# Patient Record
Sex: MALE | Race: BLACK OR AFRICAN AMERICAN | NOT HISPANIC OR LATINO | URBAN - METROPOLITAN AREA
[De-identification: names, ages, dates, MRNs, and addresses within clinical notes are randomized per-mention and may not be internally consistent; named-entity substitution may affect disease eponyms.]

---

## 2020-11-01 ENCOUNTER — HOSPITAL ENCOUNTER (INPATIENT)
Facility: HOSPITAL | Age: 20
LOS: 1 days | Discharge: HOME | DRG: 897 | End: 2020-11-02
Attending: EMERGENCY MEDICINE | Admitting: HOSPITALIST
Payer: MEDICAID

## 2020-11-01 DIAGNOSIS — T68.XXXA HYPOTHERMIA, INITIAL ENCOUNTER: Primary | ICD-10-CM

## 2020-11-01 DIAGNOSIS — E87.20 LACTIC ACIDOSIS: ICD-10-CM

## 2020-11-01 DIAGNOSIS — F10.929 ACUTE ALCOHOL INTOXICATION: ICD-10-CM

## 2020-11-01 DIAGNOSIS — F10.929 ALCOHOL INTOXICATION: ICD-10-CM

## 2020-11-01 PROBLEM — T83.9XXA FOLEY CATHETER PROBLEM: Status: ACTIVE | Noted: 2020-11-01

## 2020-11-01 PROBLEM — T83.9XXA FOLEY CATHETER PROBLEM: Status: RESOLVED | Noted: 2020-11-01 | Resolved: 2020-11-01

## 2020-11-01 LAB
ALBUMIN SERPL BCP-MCNC: 4.5 G/DL (ref 3.5–5.2)
ALP SERPL-CCNC: 85 U/L (ref 55–135)
ALT SERPL W/O P-5'-P-CCNC: 24 U/L (ref 10–44)
AMPHET+METHAMPHET UR QL: NEGATIVE
ANION GAP SERPL CALC-SCNC: 13 MMOL/L (ref 8–16)
AST SERPL-CCNC: 24 U/L (ref 10–40)
BACTERIA #/AREA URNS AUTO: ABNORMAL /HPF
BARBITURATES UR QL SCN>200 NG/ML: NEGATIVE
BASOPHILS # BLD AUTO: 0.02 K/UL (ref 0–0.2)
BASOPHILS NFR BLD: 0.2 % (ref 0–1.9)
BENZODIAZ UR QL SCN>200 NG/ML: NEGATIVE
BILIRUB SERPL-MCNC: 0.3 MG/DL (ref 0.1–1)
BILIRUB UR QL STRIP: NEGATIVE
BUN SERPL-MCNC: 6 MG/DL (ref 6–30)
BUN SERPL-MCNC: 7 MG/DL (ref 6–20)
BZE UR QL SCN: NEGATIVE
CALCIUM SERPL-MCNC: 9.2 MG/DL (ref 8.7–10.5)
CANNABINOIDS UR QL SCN: NEGATIVE
CHLORIDE SERPL-SCNC: 101 MMOL/L (ref 95–110)
CHLORIDE SERPL-SCNC: 103 MMOL/L (ref 95–110)
CLARITY UR REFRACT.AUTO: ABNORMAL
CO2 SERPL-SCNC: 27 MMOL/L (ref 23–29)
COLOR UR AUTO: ABNORMAL
CREAT SERPL-MCNC: 1 MG/DL (ref 0.5–1.4)
CREAT SERPL-MCNC: 1.3 MG/DL (ref 0.5–1.4)
CREAT UR-MCNC: 96 MG/DL (ref 23–375)
CTP QC/QA: YES
DIFFERENTIAL METHOD: ABNORMAL
EOSINOPHIL # BLD AUTO: 0 K/UL (ref 0–0.5)
EOSINOPHIL NFR BLD: 0.2 % (ref 0–8)
ERYTHROCYTE [DISTWIDTH] IN BLOOD BY AUTOMATED COUNT: 13.5 % (ref 11.5–14.5)
EST. GFR  (AFRICAN AMERICAN): >60 ML/MIN/1.73 M^2
EST. GFR  (NON AFRICAN AMERICAN): >60 ML/MIN/1.73 M^2
ETHANOL SERPL-MCNC: 273 MG/DL
GLUCOSE SERPL-MCNC: 118 MG/DL (ref 70–110)
GLUCOSE SERPL-MCNC: 119 MG/DL (ref 70–110)
GLUCOSE UR QL STRIP: NEGATIVE
HCT VFR BLD AUTO: 51.4 % (ref 40–54)
HCT VFR BLD CALC: 54 %PCV (ref 36–54)
HGB BLD-MCNC: 16.1 G/DL (ref 14–18)
HGB UR QL STRIP: ABNORMAL
HYALINE CASTS UR QL AUTO: 0 /LPF
IMM GRANULOCYTES # BLD AUTO: 0.02 K/UL (ref 0–0.04)
IMM GRANULOCYTES NFR BLD AUTO: 0.2 % (ref 0–0.5)
KETONES UR QL STRIP: NEGATIVE
LACTATE SERPL-SCNC: 2.3 MMOL/L (ref 0.5–2.2)
LACTATE SERPL-SCNC: 3 MMOL/L (ref 0.5–2.2)
LEUKOCYTE ESTERASE UR QL STRIP: NEGATIVE
LYMPHOCYTES # BLD AUTO: 1.2 K/UL (ref 1–4.8)
LYMPHOCYTES NFR BLD: 15 % (ref 18–48)
MCH RBC QN AUTO: 26.2 PG (ref 27–31)
MCHC RBC AUTO-ENTMCNC: 31.3 G/DL (ref 32–36)
MCV RBC AUTO: 84 FL (ref 82–98)
METHADONE UR QL SCN>300 NG/ML: NEGATIVE
MICROSCOPIC COMMENT: ABNORMAL
MONOCYTES # BLD AUTO: 0.2 K/UL (ref 0.3–1)
MONOCYTES NFR BLD: 2.8 % (ref 4–15)
NEUTROPHILS # BLD AUTO: 6.6 K/UL (ref 1.8–7.7)
NEUTROPHILS NFR BLD: 81.6 % (ref 38–73)
NITRITE UR QL STRIP: NEGATIVE
NRBC BLD-RTO: 0 /100 WBC
OPIATES UR QL SCN: NEGATIVE
PCP UR QL SCN>25 NG/ML: NEGATIVE
PH UR STRIP: 6 [PH] (ref 5–8)
PLATELET # BLD AUTO: 326 K/UL (ref 150–350)
PMV BLD AUTO: 9.6 FL (ref 9.2–12.9)
POC IONIZED CALCIUM: 1.13 MMOL/L (ref 1.06–1.42)
POC TCO2 (MEASURED): 27 MMOL/L (ref 23–29)
POTASSIUM BLD-SCNC: 4.4 MMOL/L (ref 3.5–5.1)
POTASSIUM SERPL-SCNC: 4.4 MMOL/L (ref 3.5–5.1)
PROT SERPL-MCNC: 8.6 G/DL (ref 6–8.4)
PROT UR QL STRIP: ABNORMAL
RBC # BLD AUTO: 6.14 M/UL (ref 4.6–6.2)
RBC #/AREA URNS AUTO: >100 /HPF (ref 0–4)
SAMPLE: ABNORMAL
SARS-COV-2 RDRP RESP QL NAA+PROBE: NEGATIVE
SODIUM BLD-SCNC: 142 MMOL/L (ref 136–145)
SODIUM SERPL-SCNC: 143 MMOL/L (ref 136–145)
SP GR UR STRIP: 1.01 (ref 1–1.03)
TOXICOLOGY INFORMATION: NORMAL
TROPONIN I SERPL DL<=0.01 NG/ML-MCNC: 0.01 NG/ML (ref 0–0.03)
TSH SERPL DL<=0.005 MIU/L-ACNC: 0.58 UIU/ML (ref 0.4–4)
URN SPEC COLLECT METH UR: ABNORMAL
WBC # BLD AUTO: 8.09 K/UL (ref 3.9–12.7)
WBC #/AREA URNS AUTO: 2 /HPF (ref 0–5)

## 2020-11-01 PROCEDURE — 99223 1ST HOSP IP/OBS HIGH 75: CPT | Mod: ,,, | Performed by: HOSPITALIST

## 2020-11-01 PROCEDURE — 81001 URINALYSIS AUTO W/SCOPE: CPT

## 2020-11-01 PROCEDURE — 85025 COMPLETE CBC W/AUTO DIFF WBC: CPT

## 2020-11-01 PROCEDURE — 99291 CRITICAL CARE FIRST HOUR: CPT | Mod: ,,, | Performed by: EMERGENCY MEDICINE

## 2020-11-01 PROCEDURE — 80307 DRUG TEST PRSMV CHEM ANLYZR: CPT

## 2020-11-01 PROCEDURE — 96361 HYDRATE IV INFUSION ADD-ON: CPT

## 2020-11-01 PROCEDURE — 11000001 HC ACUTE MED/SURG PRIVATE ROOM

## 2020-11-01 PROCEDURE — 80320 DRUG SCREEN QUANTALCOHOLS: CPT

## 2020-11-01 PROCEDURE — 84484 ASSAY OF TROPONIN QUANT: CPT

## 2020-11-01 PROCEDURE — 63600175 PHARM REV CODE 636 W HCPCS: Performed by: STUDENT IN AN ORGANIZED HEALTH CARE EDUCATION/TRAINING PROGRAM

## 2020-11-01 PROCEDURE — 83605 ASSAY OF LACTIC ACID: CPT

## 2020-11-01 PROCEDURE — 93005 ELECTROCARDIOGRAM TRACING: CPT

## 2020-11-01 PROCEDURE — 96360 HYDRATION IV INFUSION INIT: CPT

## 2020-11-01 PROCEDURE — 51702 INSERT TEMP BLADDER CATH: CPT

## 2020-11-01 PROCEDURE — 99285 EMERGENCY DEPT VISIT HI MDM: CPT | Mod: 25

## 2020-11-01 PROCEDURE — 80047 BASIC METABLC PNL IONIZED CA: CPT

## 2020-11-01 PROCEDURE — 83605 ASSAY OF LACTIC ACID: CPT | Mod: 91

## 2020-11-01 PROCEDURE — U0002 COVID-19 LAB TEST NON-CDC: HCPCS | Performed by: PHYSICIAN ASSISTANT

## 2020-11-01 PROCEDURE — 84443 ASSAY THYROID STIM HORMONE: CPT

## 2020-11-01 PROCEDURE — 99223 PR INITIAL HOSPITAL CARE,LEVL III: ICD-10-PCS | Mod: ,,, | Performed by: HOSPITALIST

## 2020-11-01 PROCEDURE — 99291 PR CRITICAL CARE, E/M 30-74 MINUTES: ICD-10-PCS | Mod: ,,, | Performed by: EMERGENCY MEDICINE

## 2020-11-01 PROCEDURE — 25000003 PHARM REV CODE 250: Performed by: PHYSICIAN ASSISTANT

## 2020-11-01 PROCEDURE — 80053 COMPREHEN METABOLIC PANEL: CPT

## 2020-11-01 RX ORDER — GLUCAGON 1 MG
1 KIT INJECTION
Status: DISCONTINUED | OUTPATIENT
Start: 2020-11-01 | End: 2020-11-02 | Stop reason: HOSPADM

## 2020-11-01 RX ORDER — SODIUM CHLORIDE 0.9 % (FLUSH) 0.9 %
10 SYRINGE (ML) INJECTION
Status: DISCONTINUED | OUTPATIENT
Start: 2020-11-01 | End: 2020-11-02 | Stop reason: HOSPADM

## 2020-11-01 RX ORDER — SODIUM CHLORIDE, SODIUM LACTATE, POTASSIUM CHLORIDE, CALCIUM CHLORIDE 600; 310; 30; 20 MG/100ML; MG/100ML; MG/100ML; MG/100ML
INJECTION, SOLUTION INTRAVENOUS CONTINUOUS
Status: ACTIVE | OUTPATIENT
Start: 2020-11-02 | End: 2020-11-02

## 2020-11-01 RX ORDER — IBUPROFEN 200 MG
16 TABLET ORAL
Status: DISCONTINUED | OUTPATIENT
Start: 2020-11-01 | End: 2020-11-02 | Stop reason: HOSPADM

## 2020-11-01 RX ORDER — SODIUM CHLORIDE 0.9 % (FLUSH) 0.9 %
10 SYRINGE (ML) INJECTION
Status: CANCELLED | OUTPATIENT
Start: 2020-11-01

## 2020-11-01 RX ORDER — SODIUM CHLORIDE, SODIUM LACTATE, POTASSIUM CHLORIDE, CALCIUM CHLORIDE 600; 310; 30; 20 MG/100ML; MG/100ML; MG/100ML; MG/100ML
INJECTION, SOLUTION INTRAVENOUS CONTINUOUS
Status: ACTIVE | OUTPATIENT
Start: 2020-11-01 | End: 2020-11-01

## 2020-11-01 RX ORDER — IBUPROFEN 200 MG
24 TABLET ORAL
Status: DISCONTINUED | OUTPATIENT
Start: 2020-11-01 | End: 2020-11-02 | Stop reason: HOSPADM

## 2020-11-01 RX ADMIN — SODIUM CHLORIDE, SODIUM LACTATE, POTASSIUM CHLORIDE, AND CALCIUM CHLORIDE: .6; .31; .03; .02 INJECTION, SOLUTION INTRAVENOUS at 01:11

## 2020-11-01 RX ADMIN — SODIUM CHLORIDE 1000 ML: 0.9 INJECTION, SOLUTION INTRAVENOUS at 06:11

## 2020-11-01 RX ADMIN — SODIUM CHLORIDE, SODIUM LACTATE, POTASSIUM CHLORIDE, AND CALCIUM CHLORIDE 1000 ML: .6; .31; .03; .02 INJECTION, SOLUTION INTRAVENOUS at 01:11

## 2020-11-01 RX ADMIN — SODIUM CHLORIDE, SODIUM LACTATE, POTASSIUM CHLORIDE, AND CALCIUM CHLORIDE 1000 ML: .6; .31; .03; .02 INJECTION, SOLUTION INTRAVENOUS at 05:11

## 2020-11-01 NOTE — MEDICAL/APP STUDENT
"[facility] Hospital Medicine  History & Physical      Sukumar Roman  YOB: 2000  Medical Record Number:  [id number]  Attending Physician:  Cam Rincon MD, [facility] HOSP MED 1   Date of Admission: 11/1/2020     Principal Problem:  Hypothermia    History     Source: Girlfriend and EMR    Cc: Per girlfriend "he got blacked out at a party last night"    HPI: Mr. Sukumar Roman is a 19 yo M with no relevant PMHx presenting with acute alcohol intoxication and hypothermia.    Per his girlfriend, pt was out partying last night and drank "a case of Truly, a fifth of [place], and several beers" which is normal at a party for him. Upon arriving back at their hotel by er, he was "not responding right" and his girl friend was unable to get him to stand up and come inside. Pt transitioned from saying "stop" to grunting unintelligibly and then stopped responding. She attempted to move him for 40min to an hour before calling EMS (outside temp of 64F). On EMS arrival pt was responsive to painful stimuli only. Pt was shivering in the ambulance and rectal temp returned at 92.4F. Attempted to contact mother for further hx and only reached voice mail.    On arrival to ED: vitals were stable except for low temp. External active warming with bear hugger was started and a 1L NS bolus was given. ECG, CXR, and CT head without contrast were performed. Piedra was inserted.  Patient regained consciousness shortly after being seen by medical student and was back to baseline by 11AM and able to drink. Piedra was removed per request but no urination without piedra despite attempt at 11AM.    Allergies  Review of patient's allergies indicates:  No Known Allergies    Medications  No current outpatient medications    History reviewed. No pertinent past medical history.     No past surgical history on file.     Social History     Tobacco Use    Smoking status: Infrequent vaping   Substance Use Topics    Alcohol use: " Binge drinking, social    Drug use: Not on file      ROS - N/A. Patient unable to respond.    Physical Examination     General:  Patient was obtunded on exam and only reacted to pain. Lying comfortably and snoring throughout. No shivering. On room air. Hansen in place, draining blood tinged urine. Peripheral IV in R cubital fossa.    Vital Signs  Vitals  Temp: 97.2 °F (36.2 °C)  Temp src: Core Bladder  Pulse: 92  Resp: 16  SpO2: 96 %  O2 Device (Oxygen Therapy): room air  BP: 123/62  MAP (mmHg): 84          24 Hour VS Range    Temp:  [92.4 °F (33.6 °C)-97.2 °F (36.2 °C)]   Pulse:  []   Resp:  [13-18]   BP: (100-145)/(51-75)   SpO2:  [95 %-100 %]   No intake or output data in the 24 hours ending 11/01/20 1125      Head: NCAT  Eyes: conjunctivae and lids normal, no scleral icterus. Pin-point pupils at room light but equal, round, and reactive when lights were turned off.  ENMT:  no gingival bleeding, normal oral mucosa without pallor or cyanosis.   Neck: Trachea non-displaced.     Chest:  Normal respiratory effort. Snoring heavily. Chest clear to auscultation.  No wheezes, rales, or rhonchi.  Heart:  Normal PMI, S1 S2 normal quality, splitting.  No murmurs rubs or gallops.  Peripheral pulses 3+.  Abdomen:  Non-distended, normal bowel sounds, non-tender.    Extremities:  No edema. Normal capillary refill.  Skin:  Warm and dry.  No cyanosis or pallor.  No ulcers, stasis.  IV sites without tenderness or inflammation.    Neurological / Psychiatric:  Obtunded.  No facial asymmetry.     Data     Recent Labs   Lab 11/01/20 0436 11/01/20 0443   WBC 8.09  --    HGB 16.1  --    HCT 51.4 54     --       No results for input(s): PROTIME, INR in the last 168 hours.     Recent Labs   Lab 11/01/20 0436      K 4.4      CO2 27   BUN 7   CREATININE 1.0   ANIONGAP 13   CALCIUM 9.2      Recent Labs   Lab 11/01/20 0436   PROT 8.6*   ALBUMIN 4.5   BILITOT 0.3   ALKPHOS 85   AST 24   ALT 24      Lactate,  veinous: 2.3  EtOH lvl: 273  Urine toxicology: negative  TSH: nl (0.582)    U/A: showed elevated blood and protein. One large clot noticed in piedra bag after insertion.    Telemetry: Not performed    ECG:  Normal sinus rhythm. ST elevation: consider lateral injury or acute infarct (Radiologist review and interpretation)    CXR: Diminished depth of inspiration with crowding, without additional radiographic evidence for acute intrathoracic process.     CT head w/o contrast: There is no evidence for acute intracranial process.    Assessment & Plan     Mr Roman is a 20 year old man here for acute alcohol intoxication and hypothermia.  Service: Hospital Medicine    ECG abnormalities  Possible ST segment elevations on ECG this AM. No clinical symptoms, no risk factors for JOSÉ. Low suspicion for MI. Most likely due to hypothermia.  - repeat ECG  - trend troponins if ECG remains abnormal  - see hypothermia plan    Hypothermia  Temperature from 92.4F and unresponsive to 97F and baseline mentation.  - Active warming with bear hugger  - monitor temperatures q1hr  - telemetry for arrhythmias    Acute alcohol intoxication  Resolved  - received 1L NS in ED  - planned additional 1 L but pt became responsive and is now able to tolerate PO hydration  - CXR to evaluate for aspiration    Abnormal neuro exam  Pt hyporeflexic. L lateral eye deviation per resident exam. Pin-point pupils. Pt now back to baseline  - CT scan for possible head trauma  - repeat neuro exam q4 hr    Lactic acidosis  Likely secondary to intoxication/hypothermia  - if admitted, follow with repeat draw    Hematuria  Suspected to be 2/2 piedra trauma.  - keep piedra in place until urine normal color  - ensure patient can void without pain after piedra removal    No DVT PPx. Pt ambulating, no other risk factors.    Reese Malik  [facility]

## 2020-11-01 NOTE — SUBJECTIVE & OBJECTIVE
History reviewed. No pertinent past medical history.    No past surgical history on file.    Review of patient's allergies indicates:  No Known Allergies    No current facility-administered medications on file prior to encounter.      No current outpatient medications on file prior to encounter.     Family History     None        Tobacco Use    Smoking status: Not on file   Substance and Sexual Activity    Alcohol use: Not on file    Drug use: Not on file    Sexual activity: Not on file     Review of Systems   Unable to perform ROS: Patient unresponsive     Objective:     Vital Signs (Most Recent):  Temp: 97.2 °F (36.2 °C) (11/01/20 0837)  Pulse: 92 (11/01/20 0837)  Resp: 16 (11/01/20 0837)  BP: (!) 120/56 (11/01/20 1132)  SpO2: 96 % (11/01/20 0837) Vital Signs (24h Range):  Temp:  [92.4 °F (33.6 °C)-97.2 °F (36.2 °C)] 97.2 °F (36.2 °C)  Pulse:  [] 92  Resp:  [13-18] 16  SpO2:  [95 %-100 %] 96 %  BP: (100-145)/(51-75) 120/56     Weight: 127 kg (280 lb)  Body mass index is 35.95 kg/m².    Physical Exam  Vitals signs and nursing note reviewed.   Constitutional:       General: He is sleeping. He is not in acute distress.     Appearance: He is obese.   HENT:      Head: Normocephalic and atraumatic.      Mouth/Throat:      Mouth: Mucous membranes are moist.      Pharynx: Oropharynx is clear.   Eyes:      Conjunctiva/sclera: Conjunctivae normal.   Neck:      Musculoskeletal: Neck supple.   Cardiovascular:      Rate and Rhythm: Normal rate and regular rhythm.      Pulses: Normal pulses.      Heart sounds: Normal heart sounds. No murmur.   Pulmonary:      Effort: Pulmonary effort is normal. No respiratory distress.      Breath sounds: Normal breath sounds. No wheezing.   Abdominal:      General: Abdomen is flat. There is no distension.      Palpations: Abdomen is soft.   Musculoskeletal:         General: No swelling or tenderness.   Skin:     General: Skin is warm and dry.      Findings: No rash.   Neurological:       Mental Status: He is unresponsive.      GCS: GCS eye subscore is 2. GCS verbal subscore is 1. GCS motor subscore is 6.       Significant Labs:   CBC:   Recent Labs   Lab 11/01/20 0436 11/01/20 0443   WBC 8.09  --    HGB 16.1  --    HCT 51.4 54     --      CMP:   Recent Labs   Lab 11/01/20 0436      K 4.4      CO2 27   *   BUN 7   CREATININE 1.0   CALCIUM 9.2   PROT 8.6*   ALBUMIN 4.5   BILITOT 0.3   ALKPHOS 85   AST 24   ALT 24   ANIONGAP 13   EGFRNONAA >60.0     Lactic Acid:   Recent Labs   Lab 11/01/20 0436   LACTATE 2.3*     Urine Studies:   Recent Labs   Lab 11/01/20  0643   COLORU Red*   APPEARANCEUA Hazy*   PHUR 6.0   SPECGRAV 1.010   PROTEINUA 2+*   GLUCUA Negative   KETONESU Negative   BILIRUBINUA Negative   OCCULTUA 3+*   NITRITE Negative   LEUKOCYTESUR Negative   RBCUA >100*   WBCUA 2   BACTERIA Rare   HYALINECASTS 0       Significant Imaging:     Chest XR: Diminished depth of inspiration with crowding, without additional radiographic evidence for acute intrathoracic process. (Timoteo Brown MD).     CT Head: There is no evidence for acute intracranial process. (Timoteo Brown MD)

## 2020-11-01 NOTE — DISCHARGE SUMMARY
"[facility] Hospital Medicine  Discharge Summary      Patient Name: Sukumar Roman  MRN: [id number]  Admission Date: 11/1/2020  Hospital Length of Stay: 0 days  Discharge Date and Time:  11/01/2020 5:19 PM  Attending Physician: Cam Rincon MD   Discharging Provider: Faustina Ledbetter MD  Primary Care Provider: No primary care provider on file.  Hospital Medicine Team: [facility] MED 1 Faustina Ledbetter MD    HPI:   Mr. Roman is a 20Y man with no known PMHx who presented to the ED via EMS after becoming unresponsive after a night out drinking for Halloween. History was obtained from the patient's girlfriend as the patient was somnolent during my interview. Per the girlfriend, the patient was at a friend's house party before going to a bar. Throughout the course of the night, the patient reportedly drank a case of Trulys, half of a fifth of Crown, and a few beers. He was awake and responsive until they got back to their hotel, at which point he sat down and fell asleep. A little while later, she attempted to wake him up but was unable to get him to respond, at which time she called EMS. She states that when he does drink for parties or special occasions, he can drink a "pretty good amount" because of his size, however this was somewhat more than he usually drinks. She reports he does not take any prescription medications or use recreational drugs, however was not with him the entire night and cannot confirm that he did not take anything.     ED Course: Arrived in ED and found to have T 35C, otherwise vitals WNL. CBC/CMP WNL, Lactate 2.3, EtOH 273. Repeat temp 33.6C. Given 1L NS bolus. CXR and CT Head unremarkable. Started re-warming patient with Claudia Hugger. Patient subsequently admitted to Hospital Medicine for management of hypothermia and acute alcohol intoxication.     * No surgery found *      Hospital Course:   Patient had bear hugger until temperature back to >98F. Patient on continuous LR as well as " received 2L IVF. Patient mentation back to baseline by 11 AM, he is determined that he would like to go home. EKG showed ST elevation, discussed with Cardiology. Likely artifact in the setting of a healthy 21 yo male and negative troponin. Patient has lactic acidosis, suspect pre renal in setting of hypothermia and alcohol intoxication. Patient has hematuria improving throughout the day with normal Cr. Patient states he feels back to baseline and understands his kidneys were injured both due to dehydration as well as during initial piedra insertion. He will receive 1 more L of LR and will be discharged with short follow up with PCP and discussion to return to the ED if his urine becomes more bloody or he urinates clots.      Consults:     No new Assessment & Plan notes have been filed under this hospital service since the last note was generated.  Service: Hospital Medicine    Final Active Diagnoses:      Problems Resolved During this Admission:    Diagnosis Date Noted Date Resolved POA    PRINCIPAL PROBLEM:  Hypothermia [T68.XXXA] 11/01/2020 11/01/2020 Yes    Alcoholic intoxication with complication [F10.929] 11/01/2020 11/01/2020 Yes    Lactic acid acidosis [E87.2] 11/01/2020 11/01/2020 Yes    Piedra catheter problem [T83.9XXA] 11/01/2020 11/01/2020 No       Discharged Condition: good    Disposition: Home or Self Care    Follow Up:    Patient Instructions:      Ambulatory referral/consult to Family Practice   Standing Status: Future   Referral Priority: Routine Referral Type: Consultation   Referral Reason: Specialty Services Required   Requested Specialty: Family Medicine   Number of Visits Requested: 1       Significant Diagnostic Studies: Labs:   CMP   Recent Labs   Lab 11/01/20  0436      K 4.4      CO2 27   *   BUN 7   CREATININE 1.0   CALCIUM 9.2   PROT 8.6*   ALBUMIN 4.5   BILITOT 0.3   ALKPHOS 85   AST 24   ALT 24   ANIONGAP 13   ESTGFRAFRICA >60.0   EGFRNONAA >60.0   , CBC   Recent  Labs   Lab 11/01/20  0436 11/01/20  0443   WBC 8.09  --    HGB 16.1  --    HCT 51.4 54     --     and Troponin   Recent Labs   Lab 11/01/20  1500   TROPONINI 0.008       Pending Diagnostic Studies:     None         Medications:  Reconciled Home Medications:      Medication List      You have not been prescribed any medications.         Indwelling Lines/Drains at time of discharge:   Lines/Drains/Airways     None                 Time spent on the discharge of patient: 35 minutes  Patient was seen and examined on the date of discharge and determined to be suitable for discharge.         Faustina Ledbetter MD  Department of Hospital Medicine  [facility]

## 2020-11-01 NOTE — ED NOTES
Pt. Resting in bed in NAD. RR e/u. Continuous cardiac, BP, and O2 monitoring in progress. VS being monitoring continuously per MD orders. Pt. Offered bathroom assistance and denies need at this time.PT aware needing to provide urine sample.

## 2020-11-01 NOTE — ED NOTES
Care assumed. PT on bp and o2 monitor. RR 18 even  And unlabored. PT aware he is going to be discharged if he can urinate.       LOC: The patient is awake, alert, and aware of environment. The patient is oriented x 3 and speaking appropriately.   APPEARANCE: No acute distress noted.   PSYCHOSOCIAL: Patient is calm and cooperative.   SKIN: The skin is warm, dry.   RESPIRATORY: Airway is open and patent. Bilateral chest rise and fall. Respirations are spontaneous, even and unlabored. Normal effort and rate noted. No accessory muscle use noted.   CARDIAC:  Denies chest pain or SOB.   ABDOMEN: Soft and non tender to palpation. No distention noted.   URINARY:  Voids independently.   NEUROLOGIC: Eyes open spontaneously. Speech clear. Tolerating saliva secretions well. Able to follow commands, demonstrating ability to actively and appropriately communicate within context of current conversation. Symmetrical facial muscles. Moving all extremities well. Movement is purposeful.   MUSCULOSKELETAL: No obvious deformities noted.

## 2020-11-01 NOTE — HOSPITAL COURSE
Patient warmed with Claudia Hugger until temperature back to >98F. Patient received total of 2L IVF and started on continuous LR as well. Mentation returned to baseline by 11 AM, he is determined that he would like to go home. EKG showed ST elevation, which was discussed with Cardiology and determined likely to be artifact given patient is an otherwise healthy 20Y male with a negative troponin. Patient also found to have lactic acidosis of 2.3 on admit which stew to peak of 3.0. Suspected pre-renal etiology in setting of hypothermia and alcohol intoxication; after hydration this resolved with lactate 1.5 the following morning. Patient also developed hematuria following insertion of Hansen. Creatinine remained normal with no other signs of kidney injury, and patient's hematuria was noted to improve with subsequent spontaneous voiding. Patient understands need to follow up with PCP within one week of discharge; also had discussion regarding need to return to the ED if his urine becomes more bloody or he urinates clots. Patient was seen and examined on the day of discharge and determined to be stable

## 2020-11-01 NOTE — SIGNIFICANT EVENT
Although patient's mentation is improving, patient still has hematuria and lactic acidosis. Patient notified he would benefit from staying overnight for IVF for pre renal lactic acidosis and confirm improvement of hematuria. Patient understands.        Faustina Ledbetter MD  Internal Medicine Resident Physician, PGY-2

## 2020-11-01 NOTE — ASSESSMENT & PLAN NOTE
Hansen catheter was inserted on patient's arrival to ED. Blood clot noted when catheter was flushed with resultant blood-tinged urine. Likely secondary to trauma from insertion.     Plan:    - Maintain Hansen until patient's urine demonstrates signs of clearing   - Continue IVF for hydration   - Once Hansen removed, will need to monitor to ensure patient can void

## 2020-11-01 NOTE — ED TRIAGE NOTES
"Sukumar Roman, a 20 y.o. male presents to the ED w/ complaint of alcohol intoxication. Pt's girlfriend called 911 after pt was found unresponsive lying outside in a sprinkler. Pt's girlfriend reports that the pt had several drinks of crown whiskey at a Halloween party and then continued to have several mixed drinks at a bar following the party. Unknown drug use. Pt's girlfriend reports pt "blacked out" and was lying outside in the sprinkler. Pt responds to painful stimuli only. Pt protecting airway at this time, sats 100% on room air. Pt hypothermic at this time. Pt does not have any pertinent PMH.    Triage note:  Chief Complaint   Patient presents with    Alcohol Intoxication     Pt arrives via EMS for alcohol intoxication.     Review of patient's allergies indicates:  Not on File  No past medical history on file.  "

## 2020-11-01 NOTE — ED NOTES
Pt Hansen catheter flushed. Large clot noted in collection bag. Urine draining appropriately at this time. NOEMÍ Chang aware of blood tinged urine.

## 2020-11-01 NOTE — ASSESSMENT & PLAN NOTE
Patient presented after consuming, per girlfriend, a case of Trulys in addition to at least half of a fifth of [place]. EtOH on admit 273. Patient somnolent and difficult to arouse, but arousable and able to follow commands on my exam.    Plan:   - Supportive care: IV fluids   - Continue telemetry    - Follow up [facility] for co-ingestions   - Repeat lactic acid in 6 hours   - Keep NPO since still somnolent

## 2020-11-01 NOTE — H&P
"[facility] Hospital Medicine  History & Physical    Patient Name: Sukumar Roman  MRN: [id number]  Admission Date: 11/1/2020  Attending Physician: Cam Rincon MD   Primary Care Provider: No primary care provider on file.    Hospital Medicine Team: [facility] MED 1 Darrius Duran MD     Patient information was obtained from spouse/SO and ER records.     Subjective:     Principal Problem:Hypothermia    Chief Complaint:   Chief Complaint   Patient presents with    Alcohol Intoxication     Pt arrives via EMS for alcohol intoxication.        HPI: Mr. Roman is a 20Y man with no known PMHx who presented to the ED via EMS after becoming unresponsive after a night out drinking for Halloween. History was obtained from the patient's girlfriend as the patient was somnolent during my interview. Per the girlfriend, the patient was at a friend's house party before going to a bar. Throughout the course of the night, the patient reportedly drank a case of Trulys, half of a fifth of Crown, and a few beers. He was awake and responsive until they got back to their hotel, at which point he sat down and fell asleep. A little while later, she attempted to wake him up but was unable to get him to respond, at which time she called EMS. She states that when he does drink for parties or special occasions, he can drink a "pretty good amount" because of his size, however this was somewhat more than he usually drinks. She reports he does not take any prescription medications or use recreational drugs, however was not with him the entire night and cannot confirm that he did not take anything.     ED Course: Arrived in ED and found to have T 35C, otherwise vitals WNL. CBC/CMP WNL, Lactate 2.3, EtOH 273. Repeat temp 33.6C. Given 1L NS bolus. CXR and CT Head unremarkable. Started re-warming patient with Claudia Hugger. Patient subsequently admitted to Hospital Medicine for management of hypothermia and acute alcohol " intoxication.     History reviewed. No pertinent past medical history.    No past surgical history on file.    Review of patient's allergies indicates:  No Known Allergies    No current facility-administered medications on file prior to encounter.      No current outpatient medications on file prior to encounter.     Family History     None        Tobacco Use    Smoking status: Not on file   Substance and Sexual Activity    Alcohol use: Not on file    Drug use: Not on file    Sexual activity: Not on file     Review of Systems   Unable to perform ROS: Patient unresponsive     Objective:     Vital Signs (Most Recent):  Temp: 97.2 °F (36.2 °C) (11/01/20 0837)  Pulse: 92 (11/01/20 0837)  Resp: 16 (11/01/20 0837)  BP: (!) 120/56 (11/01/20 1132)  SpO2: 96 % (11/01/20 0837) Vital Signs (24h Range):  Temp:  [92.4 °F (33.6 °C)-97.2 °F (36.2 °C)] 97.2 °F (36.2 °C)  Pulse:  [] 92  Resp:  [13-18] 16  SpO2:  [95 %-100 %] 96 %  BP: (100-145)/(51-75) 120/56     Weight: 127 kg (280 lb)  Body mass index is 35.95 kg/m².    Physical Exam  Vitals signs and nursing note reviewed.   Constitutional:       General: He is sleeping. He is not in acute distress.     Appearance: He is obese.   HENT:      Head: Normocephalic and atraumatic.      Mouth/Throat:      Mouth: Mucous membranes are moist.      Pharynx: Oropharynx is clear.   Eyes:      Conjunctiva/sclera: Conjunctivae normal.   Neck:      Musculoskeletal: Neck supple.   Cardiovascular:      Rate and Rhythm: Normal rate and regular rhythm.      Pulses: Normal pulses.      Heart sounds: Normal heart sounds. No murmur.   Pulmonary:      Effort: Pulmonary effort is normal. No respiratory distress.      Breath sounds: Normal breath sounds. No wheezing.   Abdominal:      General: Abdomen is flat. There is no distension.      Palpations: Abdomen is soft.   Musculoskeletal:         General: No swelling or tenderness.   Skin:     General: Skin is warm and dry.      Findings: No rash.    Neurological:      Mental Status: He is unresponsive.      GCS: GCS eye subscore is 2. GCS verbal subscore is 1. GCS motor subscore is 6.       Significant Labs:   CBC:   Recent Labs   Lab 11/01/20 0436 11/01/20 0443   WBC 8.09  --    HGB 16.1  --    HCT 51.4 54     --      CMP:   Recent Labs   Lab 11/01/20 0436      K 4.4      CO2 27   *   BUN 7   CREATININE 1.0   CALCIUM 9.2   PROT 8.6*   ALBUMIN 4.5   BILITOT 0.3   ALKPHOS 85   AST 24   ALT 24   ANIONGAP 13   EGFRNONAA >60.0     Lactic Acid:   Recent Labs   Lab 11/01/20 0436   LACTATE 2.3*     Urine Studies:   Recent Labs   Lab 11/01/20  0643   COLORU Red*   APPEARANCEUA Hazy*   PHUR 6.0   SPECGRAV 1.010   PROTEINUA 2+*   GLUCUA Negative   KETONESU Negative   BILIRUBINUA Negative   OCCULTUA 3+*   NITRITE Negative   LEUKOCYTESUR Negative   RBCUA >100*   WBCUA 2   BACTERIA Rare   HYALINECASTS 0       Significant Imaging:     Chest XR: Diminished depth of inspiration with crowding, without additional radiographic evidence for acute intrathoracic process. (Timoteo Brown MD).     CT Head: There is no evidence for acute intracranial process. (Timoteo Brown MD)    Assessment/Plan:     * Hypothermia  Patient presented with core temperature 95F, repeat temp measurement 92.4F. Patient rewarming begun in ED with Claudia Hugger. Patient is somnolent but protecting his airway and otherwise hemodynamically stable. Mental status complicated by concomitant acute alcohol intoxication.     Plan:   - Continue rewarming with blankets/Claudia Hugger   - Goal temp increase between 0.5C - 2C/hr to normothermia    Hansen catheter problem  Hansen catheter was inserted on patient's arrival to ED. Blood clot noted when catheter was flushed with resultant blood-tinged urine. Likely secondary to trauma from insertion.     Plan:    - Maintain Hansen until patient's urine demonstrates signs of clearing   - Continue IVF for hydration   - Once Hansen removed, will  need to monitor to ensure patient can void    Alcoholic intoxication with complication  Patient presented after consuming, per girlfriend, a case of Trulys in addition to at least half of a fifth of [place]. EtOH on admit 273. Patient somnolent and difficult to arouse, but arousable and able to follow commands on my exam.    Plan:   - Supportive care: IV fluids   - Continue telemetry    - Follow up UTox for co-ingestions   - Repeat lactic acid in 6 hours   - Keep NPO since still somnolent      VTE Risk Mitigation (From admission, onward)         Ordered     IP VTE HIGH RISK PATIENT  Once      11/01/20 0852     Place sequential compression device  Until discontinued      11/01/20 0852                 Darrius Duran MD, PGY-1  Department of Hospital Medicine   [facility]

## 2020-11-01 NOTE — DISCHARGE INSTRUCTIONS
Stay very well hydrated. DRINK LOTS OF WATER.    - If urine becomes more bloody or urinating clots, return to the ED.  - Follow up with a doctor in 1 week.

## 2020-11-01 NOTE — ED PROVIDER NOTES
Encounter Date: 11/1/2020       History     Chief Complaint   Patient presents with    Alcohol Intoxication     Pt arrives via EMS for alcohol intoxication.     Patient is a 20 year old male with no significant PMHX. He presents to the ED via EMS for alcohol intoxication. Unable to obtain HPI, FHX, surgical hx, or social hx due to AMS.     History obtained from girlfriend, patient consumed approximately one fifth of crown tonight at Halloween party. Girlfriend denies recreational drug use. Girlfriend denies any PMHx or allergies. They have been dating for approximately one year. Girlfriend to notify patient's family.    The history is provided by medical records. The history is limited by the condition of the patient. No  was used.     Review of patient's allergies indicates:  No Known Allergies  History reviewed. No pertinent past medical history.  No past surgical history on file.  History reviewed. No pertinent family history.  Social History     Tobacco Use    Smoking status: Not on file   Substance Use Topics    Alcohol use: Not on file    Drug use: Not on file     Review of Systems   Unable to perform ROS: Mental status change       Physical Exam     Initial Vitals [11/01/20 0355]   BP Pulse Resp Temp SpO2   116/62 (!) 57 18 (!) 95 °F (35 °C) 99 %      MAP       --         Physical Exam    Vitals reviewed.  Constitutional: He appears well-developed and well-nourished. No distress.   HENT:   Head: Normocephalic.   Eyes: Conjunctivae are normal.   Neck: Normal range of motion.   Cardiovascular: Normal rate and regular rhythm.   No murmur heard.  Pulmonary/Chest: Breath sounds normal. No respiratory distress. He has no wheezes. He has no rales.   Abdominal: Soft. Bowel sounds are normal. He exhibits no distension. There is no abdominal tenderness.   Musculoskeletal: Normal range of motion.   Neurological: He is alert. He has normal strength.   Skin: Skin is warm and dry.         ED Course    Procedures  Labs Reviewed   CBC W/ AUTO DIFFERENTIAL - Abnormal; Notable for the following components:       Result Value    MCH 26.2 (*)     MCHC 31.3 (*)     Mono # 0.2 (*)     Gran % 81.6 (*)     Lymph % 15.0 (*)     Mono % 2.8 (*)     All other components within normal limits   COMPREHENSIVE METABOLIC PANEL - Abnormal; Notable for the following components:    Glucose 118 (*)     Total Protein 8.6 (*)     All other components within normal limits   URINALYSIS, REFLEX TO URINE CULTURE - Abnormal; Notable for the following components:    Color, UA Red (*)     Appearance, UA Hazy (*)     Protein, UA 2+ (*)     Occult Blood UA 3+ (*)     All other components within normal limits    Narrative:     Place temp probe  Specimen Source->Urine   LACTIC ACID, PLASMA - Abnormal; Notable for the following components:    Lactate (Lactic Acid) 2.3 (*)     All other components within normal limits   ALCOHOL,MEDICAL (ETHANOL) - Abnormal; Notable for the following components:    Alcohol, Serum 273 (*)     All other components within normal limits   URINALYSIS MICROSCOPIC - Abnormal; Notable for the following components:    RBC, UA >100 (*)     All other components within normal limits    Narrative:     Place temp probe  Specimen Source->Urine   ISTAT PROCEDURE - Abnormal; Notable for the following components:    POC Glucose 119 (*)     All other components within normal limits   TSH   DRUG SCREEN PANEL, URINE EMERGENCY   SARS-COV-2 RDRP GENE   ISTAT CHEM8          Imaging Results          CT Head Without Contrast (Final result)  Result time 11/01/20 06:41:04    Final result by Timoteo Brown MD (11/01/20 06:41:04)                 Impression:      There is no evidence for acute intracranial process.      Electronically signed by: Timoteo Brown  Date:    11/01/2020  Time:    06:41             Narrative:    EXAMINATION:  CT HEAD WITHOUT CONTRAST    CLINICAL HISTORY:  Altered mental status;    TECHNIQUE:  Low dose axial images  were obtained through the head.  Coronal and sagittal reformations were also performed. Contrast was not administered.    COMPARISON:  None.    FINDINGS:  The ventricular system, sulcal pattern and parenchymal attenuation characteristics appear appropriate for age, there is no evidence for intracranial mass, mass effect or midline shift, and there is no evidence for acute intracranial hemorrhage.  Appropriate CSF spaces are seen at the skull base.    The visualized orbits appear intact.  The mastoid air cells appear appropriate when accounting for averaging, as do the paranasal sinuses.  The visualized osseous structures appear intact.                               X-Ray Chest AP Portable (Final result)  Result time 11/01/20 06:18:39    Final result by Timoteo Brown MD (11/01/20 06:18:39)                 Impression:      Diminished depth of inspiration with crowding, without additional radiographic evidence for acute intrathoracic process.      Electronically signed by: Timoteo Brown  Date:    11/01/2020  Time:    06:18             Narrative:    EXAMINATION:  XR CHEST AP PORTABLE    CLINICAL HISTORY:  altered mental status;    TECHNIQUE:  Single frontal view of the chest was performed.    COMPARISON:  None    FINDINGS:  Single chest view is submitted.  There is diminished depth of inspiration, this exaggerates the appearance of the cardiomediastinal silhouette and accentuates pulmonary bronchovascular markings.  There is no evidence for superimposed confluent infiltrate or consolidation, significant pleural effusion or pneumothorax.  The visualized osseous structures appear intact.                                 Medical Decision Making:   History:   Old Medical Records: I decided to obtain old medical records.  Independently Interpreted Test(s):   I have ordered and independently interpreted X-rays - see summary below.  Clinical Tests:   Lab Tests: Ordered and Reviewed  Radiological Study: Ordered and  Reviewed  Medical Tests: Ordered and Reviewed  Other:   I have discussed this case with another health care provider.       <> Summary of the Discussion: I have discussed case with hospital medicine for admission.       APC / Resident Notes:   Patient is a 20 year old male presents to the ED for emergent evaluation of alcohol intoxication.     Patient with rectal temp of 92.4. bear hugger applied. Will order labs and imaging. Will continue to monitor.     Differential diagnoses include, but are not limited to: sepsis, intoxication, cardiac arrhythmia, or electrolyte imbalance.     COVID negative. No leukocytosis. Hemodynamically stable. Elevated lactate 2.3. elevated ethanol 273. Cath UA unremarkable for infectious process. CXR found to have no acute intrathoracic process. CT head found to have no acute intracranial process.     Will admit to medicine. UDS pending.     I spent 40 minutes of critical care time with this patient. This does not include time spent on separately reported billable procedures.     I have discussed and reviewed with my supervising physician.        Clinical Impression:       ICD-10-CM ICD-9-CM   1. Hypothermia, initial encounter  T68.XXXA 991.6   2. Alcohol intoxication  F10.929 305.00   3. Lactic acidosis  E87.2 276.2                      Disposition:   Disposition: Admitted  Condition: Fair     ED Disposition Condition    Admit                             Luisana Chang PA-C  11/01/20 0742

## 2020-11-01 NOTE — ED NOTES
Per Dr Rincon, we can hold on the labs because the patient will most likely be discharged home after he can void.

## 2020-11-01 NOTE — HPI
"Mr. Roman is a 20Y man with no known PMHx who presented to the ED via EMS after becoming unresponsive after a night out drinking for Halloween. History was obtained from the patient's girlfriend as the patient was somnolent during my interview. Per the girlfriend, the patient was at a friend's house party before going to a bar. Throughout the course of the night, the patient reportedly drank a case of Trulys, half of a fifth of Crown, and a few beers. He was awake and responsive until they got back to their hotel, at which point he sat down and fell asleep. A little while later, she attempted to wake him up but was unable to get him to respond, at which time she called EMS. She states that when he does drink for parties or special occasions, he can drink a "pretty good amount" because of his size, however this was somewhat more than he usually drinks. She reports he does not take any prescription medications or use recreational drugs, however was not with him the entire night and cannot confirm that he did not take anything.     ED Course: Arrived in ED and found to have T 35C, otherwise vitals WNL. CBC/CMP WNL, Lactate 2.3, EtOH 273. Repeat temp 33.6C. Given 1L NS bolus. CXR and CT Head unremarkable. Started re-warming patient with Claudia Hugger. Patient subsequently admitted to Hospital Medicine for management of hypothermia and acute alcohol intoxication.   "

## 2020-11-01 NOTE — ED NOTES
Spoke with admit team and pt will be notified that he is now going to be admitted due to one of his labs. PT resting on cardiac, bp and o2 monitor. RR 18 even and unlabored.

## 2020-11-01 NOTE — ED NOTES
I-STAT Chem-8+ Results:   Value Reference Range   Sodium 142 136-145 mmol/L   Potassium  4.4 3.5-5.1 mmol/L   Chloride 101  mmol/L   Ionized Calcium 1.13 1.06-1.42 mmol/L   CO2 (measured) 27 23-29 mmol/L   Glucose 119  mg/dL   BUN 6 6-30 mg/dL   Creatinine 1.3 0.5-1.4 mg/dL   Hematocrit 54 36-54%

## 2020-11-01 NOTE — ED NOTES
Medicine team at bedside and updated pt that he would not go home unless her urinates, pt provided with urinal.

## 2020-11-01 NOTE — ASSESSMENT & PLAN NOTE
Patient presented with core temperature 95F, repeat temp measurement 92.4F. Patient rewarming begun in ED with Claudia Hugger. Patient is somnolent but protecting his airway and otherwise hemodynamically stable. Mental status complicated by concomitant acute alcohol intoxication.     Plan:   - Continue rewarming with blankets/Claudia Hugger   - Goal temp increase between 0.5C - 2C/hr to normothermia

## 2020-11-02 ENCOUNTER — TELEPHONE (OUTPATIENT)
Dept: INTERNAL MEDICINE | Facility: CLINIC | Age: 20
End: 2020-11-02

## 2020-11-02 VITALS
HEIGHT: 74 IN | TEMPERATURE: 99 F | DIASTOLIC BLOOD PRESSURE: 61 MMHG | HEART RATE: 60 BPM | RESPIRATION RATE: 16 BRPM | SYSTOLIC BLOOD PRESSURE: 137 MMHG | OXYGEN SATURATION: 98 % | BODY MASS INDEX: 35.94 KG/M2 | WEIGHT: 280 LBS

## 2020-11-02 PROBLEM — F10.929 ALCOHOLIC INTOXICATION WITH COMPLICATION: Status: RESOLVED | Noted: 2020-11-01 | Resolved: 2020-11-02

## 2020-11-02 LAB
ALBUMIN SERPL BCP-MCNC: 3.5 G/DL (ref 3.5–5.2)
ANION GAP SERPL CALC-SCNC: 9 MMOL/L (ref 8–16)
BUN SERPL-MCNC: 8 MG/DL (ref 6–20)
CALCIUM SERPL-MCNC: 8.6 MG/DL (ref 8.7–10.5)
CHLORIDE SERPL-SCNC: 104 MMOL/L (ref 95–110)
CO2 SERPL-SCNC: 26 MMOL/L (ref 23–29)
CREAT SERPL-MCNC: 0.9 MG/DL (ref 0.5–1.4)
EST. GFR  (AFRICAN AMERICAN): >60 ML/MIN/1.73 M^2
EST. GFR  (NON AFRICAN AMERICAN): >60 ML/MIN/1.73 M^2
GLUCOSE SERPL-MCNC: 85 MG/DL (ref 70–110)
LACTATE SERPL-SCNC: 1.6 MMOL/L (ref 0.5–2.2)
PHOSPHATE SERPL-MCNC: 3.8 MG/DL (ref 2.7–4.5)
POTASSIUM SERPL-SCNC: 4.2 MMOL/L (ref 3.5–5.1)
SODIUM SERPL-SCNC: 139 MMOL/L (ref 136–145)

## 2020-11-02 PROCEDURE — 99238 PR HOSPITAL DISCHARGE DAY,<30 MIN: ICD-10-PCS | Mod: ,,, | Performed by: HOSPITALIST

## 2020-11-02 PROCEDURE — 80069 RENAL FUNCTION PANEL: CPT

## 2020-11-02 PROCEDURE — 99238 HOSP IP/OBS DSCHRG MGMT 30/<: CPT | Mod: ,,, | Performed by: HOSPITALIST

## 2020-11-02 PROCEDURE — 36415 COLL VENOUS BLD VENIPUNCTURE: CPT

## 2020-11-02 PROCEDURE — 83605 ASSAY OF LACTIC ACID: CPT

## 2020-11-02 PROCEDURE — 63600175 PHARM REV CODE 636 W HCPCS: Performed by: STUDENT IN AN ORGANIZED HEALTH CARE EDUCATION/TRAINING PROGRAM

## 2020-11-02 RX ADMIN — SODIUM CHLORIDE, SODIUM LACTATE, POTASSIUM CHLORIDE, AND CALCIUM CHLORIDE: 600; 310; 30; 20 INJECTION, SOLUTION INTRAVENOUS at 12:11

## 2020-11-02 NOTE — PLAN OF CARE
11/02/20 1250   Post-Acute Status   Post-Acute Authorization Other   Other Status No Post-Acute Service Needs   Discharge Delays None known at this time   Discharge Plan   Discharge Plan A Home   Discharge Plan B Home     CM contacted [facility] scheduling [phone] to schedule post hospital new PCP appointment.  Message sent to the provider as the next available appointment is Dec - clinic will contact the pt with date and time.      CM to pt's bedside to inform him of appointment.  Verbalized understanding.    CM to follow for discharge planning needs.  SHIRIN WestN RN  Case Management  [phone]

## 2020-11-02 NOTE — PLAN OF CARE
11/1/2020  4:08 AM   Lactic acidosis [E87.2]  Alcohol intoxication [F10.929]  Acute alcohol intoxication [F10.929]  Hypothermia, initial encounter [T68.XXXA]     CM to patient's bedside to complete discharge planning assessment.  Pt lives with his brother in an apartment, no steps.  His brother will provide transportation home.  Uses no HME/HH/Dialysis.  Not on blood thinners.  Independent functional status, drives, in college, and works.    Pt is discharge today - no needs.     CM to follow for discharge planning needs.  BRENNEN West RN  Case Management  [phone]       Payor: MEDICAID / Plan: HEALTHY BLUE ([employer]) / Product Type: Managed Medicaid /       Extended Emergency Contact Information  Primary Emergency Contact: Dina Hernandes  Mobile Phone: [phone]  Relation: Mother  Secondary Emergency Contact: Luis Hernandes  Mobile Phone: [phone]  Relation: Brother        11/02/20 1135   Discharge Assessment   Assessment Type Discharge Planning Assessment   Confirmed/corrected address and phone number on facesheet? Yes   Assessment information obtained from? Patient   Expected Length of Stay (days) 1   Communicated expected length of stay with patient/caregiver yes   Prior to hospitilization cognitive status: Alert/Oriented   Prior to hospitalization functional status: Independent   Current cognitive status: Alert/Oriented   Current Functional Status: Independent   Facility Arrived From: NA   Lives With sibling(s)  (Lives with his brother)   Able to Return to Prior Arrangements yes   Is patient able to care for self after discharge? Yes   Who are your caregiver(s) and their phone number(s)? Mother:  Dina Cecilio [phone], Brother:  Luis Hernandes [phone]   Patient's perception of discharge disposition home or selfcare   Readmission Within the Last 30 Days no previous admission in last 30 days   Patient currently being followed by outpatient case management? No   Patient currently  receives any other outside agency services? No   Equipment Currently Used at Home none   Do you have any problems affording any of your prescribed medications? TBD   Is the patient taking medications as prescribed? yes   Does the patient have transportation home? Yes   Transportation Anticipated family or friend will provide  (His brother will provide)   Dialysis Name and Scheduled days NA   Does the patient receive services at the Coumadin Clinic? No   Discharge Plan A Home   Discharge Plan B Home   DME Needed Upon Discharge  none   Patient/Family in Agreement with Plan yes

## 2020-11-02 NOTE — ASSESSMENT & PLAN NOTE
Patient presented after consuming, per girlfriend, a case of Trulys in addition to at least half of a fifth of [place]. EtOH on admit 273. Patient somnolent, but able to be aroused and follows commands on my exam. UTox negative. EKG changes concerning for STEMI, discussed with Cardiology and likely artifact given clinical context. Supportive care provided with IV fluids. Lactic acidosis resolved. Mentation returned to baseline.     Plan:   - Follow up with PCP within one week, referral placed

## 2020-11-02 NOTE — ASSESSMENT & PLAN NOTE
Hansen catheter was inserted on patient's arrival to ED. Blood clot noted when catheter was flushed with resultant blood-tinged urine. Likely secondary to trauma from insertion. Hansen removed and patient able to void spontaneously multiple times with hematuria improving.     Plan:    - Follow up with PCP within one week, referral placed   - Return precautions (worsening hematuria, passing clots, inability to void) discussed with patient

## 2020-11-02 NOTE — DISCHARGE SUMMARY
"[facility] Medicine  Discharge Summary      Patient Name: Sukumar Roman  MRN: [id number]  Admission Date: 11/1/2020  Hospital Length of Stay: 1 days  Discharge Date and Time:  11/02/2020 9:44 AM  Attending Physician: Cam Rincon MD   Discharging Provider: Darrius Duran MD  Primary Care Provider: No primary care provider on file.    HPI:   Mr. Roman is a 20Y man with no known PMHx who presented to the ED via EMS after becoming unresponsive after a night out drinking for Halloween. History was obtained from the patient's girlfriend as the patient was somnolent during my interview. Per the girlfriend, the patient was at a friend's house party before going to a bar. Throughout the course of the night, the patient reportedly drank a case of Trulys, half of a fifth of Crown, and a few beers. He was awake and responsive until they got back to their hotel, at which point he sat down and fell asleep. A little while later, she attempted to wake him up but was unable to get him to respond, at which time she called EMS. She states that when he does drink for parties or special occasions, he can drink a "pretty good amount" because of his size, however this was somewhat more than he usually drinks. She reports he does not take any prescription medications or use recreational drugs, however was not with him the entire night and cannot confirm that he did not take anything.     ED Course: Arrived in ED and found to have T 35C, otherwise vitals WNL. CBC/CMP WNL, Lactate 2.3, EtOH 273. Repeat temp 33.6C. Given 1L NS bolus. CXR and CT Head unremarkable. Started re-warming patient with Claudia Hugger. Patient subsequently admitted to Hospital Medicine for management of hypothermia and acute alcohol intoxication.     Hospital Course:   Patient warmed with Claudia Hugger until temperature back to >98F. Patient received total of 2L IVF and started on continuous LR as well. Mentation returned to baseline by " 11 AM, he is determined that he would like to go home. EKG showed ST elevation, which was discussed with Cardiology and determined likely to be artifact given patient is an otherwise healthy 20Y male with a negative troponin. Patient also found to have lactic acidosis of 2.3 on admit which stew to peak of 3.0. Suspected pre-renal etiology in setting of hypothermia and alcohol intoxication; after hydration this resolved with lactate 1.5 the following morning. Patient also developed hematuria following insertion of Hansen. Creatinine remained normal with no other signs of kidney injury, and patient's hematuria was noted to improve with subsequent spontaneous voiding. Patient understands need to follow up with PCP within one week of discharge; also had discussion regarding need to return to the ED if his urine becomes more bloody or he urinates clots. Patient was seen and examined on the day of discharge and determined to be stable      Physical Exam  Vitals signs and nursing note reviewed.   Constitutional:       General: He is awake. He is not in acute distress.  HENT:      Head: Normocephalic and atraumatic.      Mouth/Throat:      Mouth: Mucous membranes are moist.      Pharynx: Oropharynx is clear.   Eyes:      Extraocular Movements: Extraocular movements intact.      Conjunctiva/sclera: Conjunctivae normal.   Neck:      Musculoskeletal: Neck supple.   Cardiovascular:      Rate and Rhythm: Normal rate and regular rhythm.      Pulses: Normal pulses.      Heart sounds: Normal heart sounds.   Pulmonary:      Effort: Pulmonary effort is normal. No respiratory distress.      Breath sounds: Normal breath sounds. No wheezing.   Abdominal:      General: Bowel sounds are normal. There is no distension.      Palpations: Abdomen is soft.      Tenderness: There is no abdominal tenderness.   Musculoskeletal:         General: No swelling or deformity.   Skin:     General: Skin is warm and dry.   Neurological:      General: No  focal deficit present.      Mental Status: He is alert and oriented to person, place, and time. Mental status is at baseline.   Psychiatric:         Mood and Affect: Mood normal.         Behavior: Behavior normal.       * Hypothermia-resolved as of 11/1/2020  Patient presented with core temperature 95F, repeat temp measurement 92.4F. Patient rewarming begun in ED with Claudia Ramongger. Patient was somnolent but protecting his airway and otherwise hemodynamically stable. Patient rewarmed to temp >98F and mental status returned to baseline.     Plan:   - Follow up with PCP within one week, referral placed    Hansne catheter problem-resolved as of 11/1/2020  Hansen catheter was inserted on patient's arrival to ED. Blood clot noted when catheter was flushed with resultant blood-tinged urine. Likely secondary to trauma from insertion. Hansen removed and patient able to void spontaneously multiple times with hematuria improving.     Plan:    - Follow up with PCP within one week, referral placed   - Return precautions (worsening hematuria, passing clots, inability to void) discussed with patient    Alcoholic intoxication with complication-resolved as of 11/2/2020  Patient presented after consuming, per girlfriend, a case of Trulys in addition to at least half of a fifth of [place]. EtOH on admit 273. Patient somnolent, but able to be aroused and follows commands on my exam. UTox negative. EKG changes concerning for STEMI, discussed with Cardiology and likely artifact given clinical context. Supportive care provided with IV fluids. Lactic acidosis resolved. Mentation returned to baseline.     Plan:   - Follow up with PCP within one week, referral placed    Final Active Diagnoses:    Diagnosis Date Noted POA    Hypothermia [T68.XXXA] 11/01/2020 Yes      Problems Resolved During this Admission:    Diagnosis Date Noted Date Resolved POA    PRINCIPAL PROBLEM:  Hypothermia [T68.XXXA] 11/01/2020 11/01/2020 Yes    Alcoholic  intoxication with complication [F10.929] 11/01/2020 11/02/2020 Yes    Lactic acid acidosis [E87.2] 11/01/2020 11/01/2020 Yes    Hansen catheter problem [T83.9XXA] 11/01/2020 11/01/2020 No       Discharged Condition: good    Disposition: Home or Self Care    Follow Up:    Follow-up Information     Please follow up.    Why: Hospital Follow Up - [facility] ([phone]) TO CALL PATIENT WITH APPOINTMENT TIME AND DATE                Patient Instructions:      Ambulatory referral/consult to Family Practice   Standing Status: Future   Referral Priority: Routine Referral Type: Consultation   Referral Reason: Specialty Services Required   Requested Specialty: Family Medicine   Number of Visits Requested: 1     Significant Diagnostic Studies: Labs:   CMP   Recent Labs   Lab 11/01/20  0436 11/02/20  0653    139   K 4.4 4.2    104   CO2 27 26   * 85   BUN 7 8   CREATININE 1.0 0.9   CALCIUM 9.2 8.6*   PROT 8.6*  --    ALBUMIN 4.5 3.5   BILITOT 0.3  --    ALKPHOS 85  --    AST 24  --    ALT 24  --    ANIONGAP 13 9   ESTGFRAFRICA >60.0 >60.0   EGFRNONAA >60.0 >60.0     CBC   Recent Labs   Lab 11/01/20  0436 11/01/20  0443   WBC 8.09  --    HGB 16.1  --    HCT 51.4 54     --       Troponin   Recent Labs   Lab 11/01/20  1500   TROPONINI 0.008       Medications:  Reconciled Home Medications:      Medication List      You have not been prescribed any medications.         Indwelling Lines/Drains at time of discharge: None    Time spent on the discharge of patient: 40 minutes  Patient was seen and examined on the date of discharge and determined to be suitable for discharge.       Darrius Duran MD, PGY-1  Department of Hospital Medicine  [facility]

## 2020-11-02 NOTE — TELEPHONE ENCOUNTER
----- Message from Eileen Metcalf sent at 11/2/2020  9:42 AM CST -----  Regarding: appt request  Contact: Wilma, [facility]  Calling for an appt for pt for a hospital follow up in a week. Please call pt at [phone]

## 2020-11-02 NOTE — PLAN OF CARE
Patient discharged home - no needs.  CM contacted [facility] - Hospital Follow Up - [facility] ([phone]) TO CALL PATIENT WITH APPOINTMENT TIME AND DATE    CM to follow for discharge planning needs.  BRENNEN West RN  Case Management  [id number]        11/02/20 1426   Final Note   Assessment Type Final Discharge Note   Anticipated Discharge Disposition Home   Hospital Follow Up  Appt(s) scheduled? Yes   Discharge plans and expectations educations in teach back method with documentation complete? Yes   Post-Acute Status   Post-Acute Authorization Other   Other Status No Post-Acute Service Needs   Discharge Delays None known at this time

## 2020-11-02 NOTE — ASSESSMENT & PLAN NOTE
Patient presented with core temperature 95F, repeat temp measurement 92.4F. Patient rewarming begun in ED with Claudia Hugger. Patient was somnolent but protecting his airway and otherwise hemodynamically stable. Patient rewarmed to temp >98F and mental status returned to baseline.     Plan:   - Follow up with PCP within one week, referral placed

## 2020-11-02 NOTE — PLAN OF CARE
A&Ox4  Lungs clear  NSR/ [place]  Ambulated to restroom independently,  VSS  LR 125mL/hr  Call light in reach  Night uneventful

## 2020-11-03 ENCOUNTER — PATIENT OUTREACH (OUTPATIENT)
Dept: ADMINISTRATIVE | Facility: CLINIC | Age: 20
End: 2020-11-03

## 2020-11-03 NOTE — PROGRESS NOTES
C3 nurse attempted to contact patient. No answer.  C3 nurse attempted to contact Sukumar Roman for a TCC post hospital discharge follow up call. No answer at phone number listed and no voicemail available. The patient does not have a scheduled HOSFU appointment within 7-14 days post hospital discharge date 11/2/20. No pcp listed
